# Patient Record
Sex: FEMALE | Race: ASIAN | NOT HISPANIC OR LATINO | URBAN - METROPOLITAN AREA
[De-identification: names, ages, dates, MRNs, and addresses within clinical notes are randomized per-mention and may not be internally consistent; named-entity substitution may affect disease eponyms.]

---

## 2017-09-07 ENCOUNTER — OFFICE VISIT - HEALTHEAST (OUTPATIENT)
Dept: FAMILY MEDICINE | Facility: CLINIC | Age: 42
End: 2017-09-07

## 2017-09-07 DIAGNOSIS — Z30.09 BIRTH CONTROL COUNSELING: ICD-10-CM

## 2017-09-07 DIAGNOSIS — Z30.46 NEXPLANON REMOVAL: ICD-10-CM

## 2017-09-07 ASSESSMENT — MIFFLIN-ST. JEOR: SCORE: 1250.29

## 2018-05-07 ENCOUNTER — OFFICE VISIT - HEALTHEAST (OUTPATIENT)
Dept: FAMILY MEDICINE | Facility: CLINIC | Age: 43
End: 2018-05-07

## 2018-05-07 DIAGNOSIS — Z83.3 FAMILY HISTORY OF DIABETES MELLITUS: ICD-10-CM

## 2018-05-07 DIAGNOSIS — Z11.1 SCREENING FOR TUBERCULOSIS: ICD-10-CM

## 2018-05-07 LAB
FASTING STATUS PATIENT QL REPORTED: YES
GLUCOSE BLD-MCNC: 89 MG/DL (ref 70–99)

## 2018-05-07 ASSESSMENT — MIFFLIN-ST. JEOR: SCORE: 1270.71

## 2018-05-09 LAB
QTF INTERPRETATION: NORMAL
QTF MITOGEN - NIL: >10 IU/ML
QTF NIL: 0.12 IU/ML
QTF RESULT: NEGATIVE
QTF TB ANTIGEN - NIL: -0.01 IU/ML

## 2018-05-10 ENCOUNTER — COMMUNICATION - HEALTHEAST (OUTPATIENT)
Dept: FAMILY MEDICINE | Facility: CLINIC | Age: 43
End: 2018-05-10

## 2018-09-04 ENCOUNTER — COMMUNICATION - HEALTHEAST (OUTPATIENT)
Dept: CARE COORDINATION | Facility: CLINIC | Age: 43
End: 2018-09-04

## 2018-09-10 ENCOUNTER — OFFICE VISIT - HEALTHEAST (OUTPATIENT)
Dept: FAMILY MEDICINE | Facility: CLINIC | Age: 43
End: 2018-09-10

## 2019-05-03 ENCOUNTER — COMMUNICATION - HEALTHEAST (OUTPATIENT)
Dept: FAMILY MEDICINE | Facility: CLINIC | Age: 44
End: 2019-05-03

## 2021-05-28 NOTE — TELEPHONE ENCOUNTER
LVM via  to call clinic back. If she calls back please assist her with scheduling a physical as she is do for a pap smear. Thanks

## 2021-05-31 VITALS — BODY MASS INDEX: 26.78 KG/M2 | HEIGHT: 62 IN | WEIGHT: 145.5 LBS

## 2021-06-01 VITALS — WEIGHT: 150 LBS | BODY MASS INDEX: 27.6 KG/M2 | HEIGHT: 62 IN

## 2021-06-12 NOTE — PROGRESS NOTES
"ASSESSMENT:  1. Nexplanon removal-Nexplanon removed without difficulty.  A Band-Aid was applied and patient was told she can remove it tomorrow and then treat it like normal skin.  She should return if any signs of infection develop.    2. Birth control counseling-I discussed with her whether or not she wants to use something else for contraception.  She said she does not have a  right now so does not want to use anything.  I recommended that and get started on something else for contraception.  If she starts to having sex with anyone that she come in    CHIEF COMPLAINT:  Chief Complaint   Patient presents with     Nexplanon removal       HISTORY OF PRESENT ILLNESS:  Dalila is a 42 y.o. female presenting to the clinic today for a removal of nexplanon. She is accompanied by an .     Nexplanon Removal: She has not had a period while on nexplanon. She would not like to pursue another method of birth control because she has no .    REVIEW OF SYSTEMS:   She would not like a flu shot today because she has to  her daughter. She has some back pain that she is not treating with medication. She would not like to take any medication for her back pain as she likes to avoid taking any medications if possible. All other systems are negative.    PFSH:  Her children are 5 and 3 years of age.    TOBACCO USE:  History   Smoking Status     Current Every Day Smoker   Smokeless Tobacco     Never Used       VITALS:  Vitals:    09/07/17 1035   BP: 114/80   Patient Site: Left Arm   Patient Position: Sitting   Cuff Size: Adult Regular   Pulse: 72   Resp: 18   Temp: 98  F (36.7  C)   TempSrc: Oral   SpO2: 98%   Weight: 145 lb 8 oz (66 kg)   Height: 5' 1.5\" (1.562 m)     Wt Readings from Last 3 Encounters:   09/07/17 145 lb 8 oz (66 kg)   05/31/14 157 lb (71.2 kg)     Body mass index is 27.05 kg/(m^2).    PHYSICAL EXAM:  Constitutional: Alert, cooperative, no distress, appears stated age.  Head: Normocephalic, " without obvious abnormality, atraumatic  Eyes:  Conjunctiva clear  Ears: Normal TM's and external ear canals  Nose: Nares normal  Neck: Supple  Lungs: resp rate and effort normal  Extremities: Extremities normal, atraumatic, no cyanosis or edema  Skin: Skin color, texture, turgor normal, no rashes or lesions  Lymph nodes: Cervical, supraclavicular, and axillary nodes normal  Neurologic: gait normal  Psych: Mood and affect appropriate. Thought processes and judgement normal.     Procedure note: The patient's left upper arm was prepped in sterile fashion around the site of the Nexplanon insertion.  The area was numbed with 1% lidocaine.  The skin was punctured with an 11 blade scalpel.  The implant was grasped with a forceps and removed without difficulty.  Patient tolerated the procedure well.  There were no complications.        ADDITIONAL HISTORY SUMMARIZED (2): None.  DECISION TO OBTAIN EXTRA INFORMATION (1): None.   RADIOLOGY TESTS (1): None.  LABS (1): None.  MEDICINE TESTS (1): None.  INDEPENDENT REVIEW (2 each): None.     The visit lasted a total of 13 minutes face to face with the patient. Over 50% of the time was spent counseling and educating the patient about birth control.    I, Soledad Michael, am scribing for and in the presence of, Dr. Cavazos.    I, Madonna Cavazos, personally performed the services described in this documentation, as scribed by Soledad Michael in my presence, and it is both accurate and complete.    MEDICATIONS:  Current Outpatient Prescriptions   Medication Sig Dispense Refill     etonogestrel (NEXPLANON) 68 mg Impl implant Exp 9/2016       acetaminophen (TYLENOL) 325 MG tablet Take 650 mg by mouth as needed. Every 4 to 6 hours.       cholecalciferol, vitamin D3, 5,000 unit Tab Take 1 tablet by mouth daily.       docusate sodium (COLACE) 100 MG capsule Take 100 mg by mouth 2 (two) times a day.       ferrous sulfate 65 mg elemental iron (IRON, FERROUS SULFATE,) Take 1 tablet by mouth 2  (two) times a day. Take with fruit or 4 ounces of juice       prenatal vit-iron fumarate-FA (PRENATAL) 27-0.8 mg Tab Take 1 tablet by mouth daily.       No current facility-administered medications for this visit.        Total data points: None

## 2021-06-16 PROBLEM — R94.31 PROLONGED QT INTERVAL: Status: ACTIVE | Noted: 2018-09-10

## 2021-06-16 PROBLEM — R42 DIZZINESS: Status: ACTIVE | Noted: 2018-08-30

## 2021-06-16 PROBLEM — R55 SYNCOPE: Status: ACTIVE | Noted: 2018-08-30

## 2021-06-16 PROBLEM — F10.10 ETOH ABUSE: Status: ACTIVE | Noted: 2018-10-29

## 2021-06-17 NOTE — PROGRESS NOTES
"S:  43-year-old female who comes in today for a TB test.  She is doing well.  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  She has not been exposed to any tuberculosis that she is aware of.  She has never been treated for latent TB.  To her knowledge she has never received the BCG vaccine.  She denies any coughing.  No increase in sputum.  No bloody sputum.  No night sweats.  No unexplained weight loss.  In fact she says the opposite she has been gaining weight.  She does have a family history of diabetes and her mother.  She says that she does little to no exercise.  She does not eat a very healthy diet.  She denies any nocturia.    She denies any need for birth control today.  She says she is not sexually active.  She feels safe at home.    O:  /86  Pulse 68  Temp 97.8  F (36.6  C) (Oral)   Resp 12  Ht 5' 1.5\" (1.562 m)  Wt 150 lb (68 kg)  LMP 05/05/2018  SpO2 98%  BMI 27.88 kg/m2  Gen: no acute distress  Neck:  Supple, no lad, no lymphadenopathy.    Heart:  Regular rate and rhythm.  No m/r/g  Lungs: cta bilaterally, no wheezes or rhonchi.  Good air inspiration  Abdomen:  No masses or organomegaly, soft.  Overweight.  Non tender.    Extremities:  No edema.           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 Every 4 to 6 hours.       cholecalciferol, vitamin D3, 5,000 unit Tab Take 1 tablet by mouth daily.       docusate sodium (COLACE) 100 MG capsule Take 100 mg by mouth 2 (two) times a day.       etonogestrel (NEXPLANON) 68 mg Impl implant Exp 9/2016       ferrous sulfate 65 mg elemental iron (IRON, FERROUS SULFATE,) Take 1 tablet by mouth 2 (two) " times a day. Take with fruit or 4 ounces of juice       prenatal vit-iron fumarate-FA (PRENATAL) 27-0.8 mg Tab Take 1 tablet by mouth daily.       No current facility-administered medications on file prior to visit.           Recent Results (from the past 48 hour(s))   Glucose    Collection Time: 05/07/18 11:56 AM   Result Value Ref Range    Glucose 89 70 - 99 mg/dL    Patient Fasting > 8hrs? Yes          Assessment/Plan:  1. Screening for tuberculosis  We reviewed the options for a Manto test versus a QuantiFERON.  She said she would have a lot of difficulty coming back to have her Mantoux read.  She requested a QuantiFERON.  This will be sent to her in the mail.    - QTF-Mycobacterium tuberculosis by QuantiFERON-TB Gold    2. Family history of diabetes mellitus  I did recommend some diet changes as well as exercise changes.  We reviewed the importance of walking on a regular basis.  We reviewed the increased risk that she has of developing diabetes given the family history of diabetes.  We have reviewed the importance of maintaining a healthy weight and an active lifestyle.  We reviewed the signs and symptoms of diabetes.    3.  Hcm:    She is due for a Pap smear.  This was reviewed with the patient today.  She declined a Pap smear.  We did review the risks of cervical cancer and the function of the Pap smear.  She says that she will return at a later date.    The entire conversation today was conducted through the use of a professional .    - Glucose          Sara Bernard   5/7/2018 2:13 PM